# Patient Record
Sex: FEMALE | Race: WHITE
[De-identification: names, ages, dates, MRNs, and addresses within clinical notes are randomized per-mention and may not be internally consistent; named-entity substitution may affect disease eponyms.]

---

## 2017-01-28 ENCOUNTER — HOSPITAL ENCOUNTER (EMERGENCY)
Dept: HOSPITAL 62 - ER | Age: 67
LOS: 1 days | Discharge: HOME | End: 2017-01-29
Payer: MEDICARE

## 2017-01-28 DIAGNOSIS — R05: ICD-10-CM

## 2017-01-28 DIAGNOSIS — I10: ICD-10-CM

## 2017-01-28 DIAGNOSIS — I44.0: ICD-10-CM

## 2017-01-28 DIAGNOSIS — Z87.891: ICD-10-CM

## 2017-01-28 DIAGNOSIS — E87.1: ICD-10-CM

## 2017-01-28 DIAGNOSIS — R45.4: ICD-10-CM

## 2017-01-28 DIAGNOSIS — J44.9: ICD-10-CM

## 2017-01-28 DIAGNOSIS — D72.828: ICD-10-CM

## 2017-01-28 DIAGNOSIS — N39.0: Primary | ICD-10-CM

## 2017-01-28 LAB
ALBUMIN SERPL-MCNC: 4 G/DL (ref 3.5–5)
ALP SERPL-CCNC: 103 U/L (ref 38–126)
ALT SERPL-CCNC: 40 U/L (ref 9–52)
ANION GAP SERPL CALC-SCNC: 9 MMOL/L (ref 5–19)
AST SERPL-CCNC: 20 U/L (ref 14–36)
BASOPHILS NFR BLD MANUAL: 0 % (ref 0–2)
BILIRUB DIRECT SERPL-MCNC: 0 MG/DL (ref 0–0.3)
BILIRUB SERPL-MCNC: 0.3 MG/DL (ref 0.2–1.3)
BUN SERPL-MCNC: 15 MG/DL (ref 7–20)
CALCIUM: 9.2 MG/DL (ref 8.4–10.2)
CHLORIDE SERPL-SCNC: 92 MMOL/L (ref 98–107)
CK MB SERPL-MCNC: 1.44 NG/ML (ref ?–4.55)
CK SERPL-CCNC: 98 U/L (ref 30–135)
CO2 SERPL-SCNC: 25 MMOL/L (ref 22–30)
CREAT SERPL-MCNC: 0.84 MG/DL (ref 0.52–1.25)
EOSINOPHIL NFR BLD MANUAL: 2 % (ref 0–6)
ERYTHROCYTE [DISTWIDTH] IN BLOOD BY AUTOMATED COUNT: 12.6 % (ref 11.5–14)
GLUCOSE SERPL-MCNC: 148 MG/DL (ref 75–110)
HCT VFR BLD CALC: 33 % (ref 36–47)
HGB BLD-MCNC: 11.4 G/DL (ref 12–15.5)
HGB HCT DIFFERENCE: 1.2
MCH RBC QN AUTO: 31.6 PG (ref 27–33.4)
MCHC RBC AUTO-ENTMCNC: 34.6 G/DL (ref 32–36)
MCV RBC AUTO: 91 FL (ref 80–97)
NEUTS BAND NFR BLD MANUAL: 3 % (ref 3–5)
PLATELET CLUMP BLD QL SMEAR: PRESENT
POTASSIUM SERPL-SCNC: 4.5 MMOL/L (ref 3.6–5)
PROT SERPL-MCNC: 6.4 G/DL (ref 6.3–8.2)
RBC # BLD AUTO: 3.62 10^6/UL (ref 3.72–5.28)
RBC MORPH BLD: (no result)
SODIUM SERPL-SCNC: 126.2 MMOL/L (ref 137–145)
TOTAL CELLS COUNTED BLD: 100
TROPONIN I SERPL-MCNC: < 0.012 NG/ML
VARIANT LYMPHS NFR BLD MANUAL: 8 % (ref 13–45)
WBC # BLD AUTO: 15 10^3/UL (ref 4–10.5)

## 2017-01-28 PROCEDURE — 96365 THER/PROPH/DIAG IV INF INIT: CPT

## 2017-01-28 PROCEDURE — 85025 COMPLETE CBC W/AUTO DIFF WBC: CPT

## 2017-01-28 PROCEDURE — 82553 CREATINE MB FRACTION: CPT

## 2017-01-28 PROCEDURE — 80053 COMPREHEN METABOLIC PANEL: CPT

## 2017-01-28 PROCEDURE — 93005 ELECTROCARDIOGRAM TRACING: CPT

## 2017-01-28 PROCEDURE — 36415 COLL VENOUS BLD VENIPUNCTURE: CPT

## 2017-01-28 PROCEDURE — 99285 EMERGENCY DEPT VISIT HI MDM: CPT

## 2017-01-28 PROCEDURE — 81001 URINALYSIS AUTO W/SCOPE: CPT

## 2017-01-28 PROCEDURE — 80307 DRUG TEST PRSMV CHEM ANLYZR: CPT

## 2017-01-28 PROCEDURE — 71010: CPT

## 2017-01-28 PROCEDURE — 96361 HYDRATE IV INFUSION ADD-ON: CPT

## 2017-01-28 PROCEDURE — 93010 ELECTROCARDIOGRAM REPORT: CPT

## 2017-01-28 PROCEDURE — 84484 ASSAY OF TROPONIN QUANT: CPT

## 2017-01-28 PROCEDURE — 82550 ASSAY OF CK (CPK): CPT

## 2017-01-28 NOTE — ER DOCUMENT REPORT
ED General





- General


Stated Complaint: DIFFICULTY BREATHING


Time seen by provider: 21:50


Notes: 


Patient is a 66-year-old female that comes emergency department for chief 

complaint of an episode earlier tonight where she started feeling poorly and 

appeared pale, and felt nauseated.  EMS was called, they found her blood 

pressure to be low and she had hypoxia; report from  is that they were 

having sexual intercourse and smoking marijuana when patient began to feel 

poorly.  This is reportedly happened several times recently.  Patient denying 

any current symptoms, she states she has had a cough with white sputum 

production for the past 2 days, a past history of COPD, HTN.  Patient denies 

any fevers.





TRAVEL OUTSIDE OF THE [place] IN LAST 30 DAYS: No





- Related Data


Allergies/Adverse Reactions: 


 





No Known Allergies Allergy (Verified 09/28/15 22:48)


 











Past Medical History





- General


Information source: Patient





- Social History


Smoking Status: Former Smoker


Frequency of alcohol use: None


Drug Abuse: None


Lives with: Family


Family History: Reviewed & Not Pertinent





- Past Medical History


Cardiac Medical History: Reports: Hx Hypercholesterolemia, Hx Hypertension


Pulmonary Medical History: Reports: Hx Bronchitis, Hx COPD


Neurological Medical History: Denies: Hx Seizures


GI Medical History: Reports: Hx Gastritis, Hx Ulcer - Bleeding duodenal ulcer


Past Surgical History: Reports: Hx Vascular Surgery - Percussive pain 

stripping..  Denies: Hx Hysterectomy





- Immunizations


Hx Diphtheria, Pertussis, Tetanus Vaccination: Yes


Hx Pneumococcal Vaccination: 01/30/14





Review of Systems





- Review of Systems


Constitutional: No symptoms reported


EENT: No symptoms reported


Cardiovascular: See HPI


Respiratory: See HPI


Gastrointestinal: See HPI


Genitourinary: No symptoms reported


Female Genitourinary: No symptoms reported


Musculoskeletal: No symptoms reported


Skin: No symptoms reported


Hematologic/Lymphatic: No symptoms reported


Neurological/Psychological: See HPI





Physical Exam





- Vital signs


Vitals: 


 











Resp Pulse Ox


 


 11 L  99 


 


 01/28/17 21:41  01/28/17 21:41











Interpretation: Normal





- General


General appearance: Appears well, Alert


In distress: None





- HEENT


Head: Normocephalic, Atraumatic


Eyes: Normal


Pupils: PERRL





- Respiratory


Respiratory status: No respiratory distress.  No: Labored, Tachypnea


Chest status: Nontender


Breath sounds: Decreased air movement - Minimally.  No: Nonproductive cough, 

Stridor, Wheezing


Chest palpation: Normal





- Cardiovascular


Rhythm: Regular.  No: Tachycardia


Heart sounds: Normal auscultation, S1 appreciated, S2 appreciated


Murmur: No





- Abdominal


Inspection: Normal


Distension: No distension


Bowel sounds: Normal


Tenderness: Nontender.  No: Tender, Guarding


Organomegaly: No organomegaly





- Back


Back: Normal, Nontender





- Extremities


General upper extremity: Normal inspection, Nontender, Normal color, Normal ROM

, Normal temperature


General lower extremity: Normal inspection, Nontender, Normal color, Normal ROM

, Normal temperature, Normal weight bearing.  No: Reji's sign





- Neurological


Neuro grossly intact: Yes


Cognition: Normal.  No: Confused, Inattentive


Orientation: AAOx4.  No: Disoriented to person, Disoriented to place, 

Disoriented to time, Disoriented to events


Giovani Coma Scale Eye Opening: Spontaneous


Swatara Coma Scale Verbal: Oriented


Giovani Coma Scale Motor: Obeys Commands


Giovani Coma Scale Total: 15


Speech: Normal


Cranial nerves: Normal


Cerebellar coordination: Normal


Motor strength normal: LUE, RUE, LLE, RLE


Additional motor exam normals: Equal 


Sensory: Normal





- Psychological


Associated symptoms: Other - Patient has a very slightly sluggish response to 

any questions but answers all questions appropriately, patient is actually 

mildly irritable and answer some questions shortly.  Patient on reevaluation 

normalized.





- Skin


Skin Temperature: Warm


Skin Moisture: Dry


Skin Color: Normal





Course





- Re-evaluation


Re-evalutation: 


Patient is responsive and answers questions appropriately but doesn't appear to 

be under the influence of a substance.  No neurological deficits, no hypotension

, tachycardia, or fever. Lungs clear. No tachypnea. 





Chest x-ray is unremarkable. EKG sinus rhythm with first-degree block, no 

change from prior.  Chemistry shows low sodium, compared to previous patient 

has borderline to low sodium frequently.  Patient was already given a bolus of 

normal saline.  Patient states that she is intentionally sodium restricting 

from her diet on her physician's orders, she states that she "might of taken it 

the extreme". 





CBC shows leukocytosis of 15,000 with elevation of neutrophils but no bands.  

Urinalysis shows large leukocyte esterase with white blood cells and bacteria.  

Urine cultured, patient given Levaquin, on reevaluation patient looking improved

, states she feels great, requesting to go home.  Patient declining any 

additional workup, however she does agree to follow-up with her provider within 

the next several days for repeat of her sodium level, agrees to take Levaquin 

antibiotic, and agrees to return if she develops any worsening or new 

concerning symptoms.   states satisfaction agreement as well.











- Vital Signs


Vital signs: 


 











Temp Pulse Resp BP Pulse Ox


 


 97.6 F   62   16   131/80 H  100 


 


 01/29/17 02:45  01/28/17 22:19  01/29/17 02:25  01/29/17 02:25  01/29/17 02:25














- Laboratory


Result Diagrams: 


 01/28/17 23:05





 01/28/17 23:05


Laboratory results interpreted by me: 


 











  01/28/17 01/28/17 01/28/17





  23:05 23:05 23:53


 


WBC  15.0 H  


 


RBC  3.62 L  


 


Hgb  11.4 L  


 


Hct  33.0 L  


 


Seg Neuts % (Manual)  85 H  


 


Lymphocytes % (Manual)  8 L  


 


Monocytes % (Manual)  2 L  


 


Abs Neuts (Manual)  13.2 H  


 


Sodium   126.2 L 


 


Chloride   92 L 


 


Glucose   148 H 


 


Urine Protein    30 H


 


Ur Leukocyte Esterase    LARGE H














- EKG Interpretation by Me


EKG shows normal: Sinus rhythm


Rate: Normal


Rhythm: NSR


Heart block present: 1st Degree


When compared to previous EKG there are: No significant change





Discharge





- Discharge


Clinical Impression: 


 Hyponatremia, Nausea





Urinary tract infection


Qualifiers:


 Urinary tract infection type: site unspecified Hematuria presence: without 

hematuria Qualified Code(s): N39.0 - Urinary tract infection, site not specified





Condition: Stable


Disposition: HOME, SELF-CARE


Additional Instructions: 


Your workup shows urinary tract infection and low sodium.  Please take the 

Levaquin antibiotic as directed, you have been given treatment for low sodium 

but please follow-up within the next several days with her primary care for 

repeat.  Stop sodium/salt restriction to the extent that you're performing, you 

need more in your diet.


Avoid marijuana as this appears to cause nausea in your case.


Return to emergency department immediately for any concerning or worsening 

symptoms including shortness breath, chest pain, passing out, fever, etc.


Prescriptions: 


Levofloxacin [Levaquin 500 mg Tablet] 500 mg PO DAILY #7 tablet


Referrals: 


AGUSTIN LARSEN FNP-C [Primary Care Provider] - Follow up as needed

## 2017-01-29 VITALS — SYSTOLIC BLOOD PRESSURE: 131 MMHG | DIASTOLIC BLOOD PRESSURE: 80 MMHG

## 2017-01-29 LAB
APPEARANCE UR: (no result)
BARBITURATES UR QL SCN: NEGATIVE
BILIRUB UR QL STRIP: NEGATIVE
GLUCOSE UR STRIP-MCNC: NEGATIVE MG/DL
KETONES UR STRIP-MCNC: NEGATIVE MG/DL
METHADONE UR QL SCN: NEGATIVE
NITRITE UR QL STRIP: NEGATIVE
PCP UR QL SCN: NEGATIVE
PH UR STRIP: 6 [PH] (ref 5–9)
PROT UR STRIP-MCNC: 30 MG/DL
SP GR UR STRIP: 1.01
URINE OPIATES LOW: NEGATIVE
UROBILINOGEN UR-MCNC: NEGATIVE MG/DL (ref ?–2)

## 2017-01-29 NOTE — EKG REPORT
SEVERITY:- ABNORMAL ECG -

SINUS RHYTHM

FIRST DEGREE AV BLOCK

:

Confirmed by: Deja Scott MD 29-Jan-2017 09:38:08

## 2017-04-13 ENCOUNTER — HOSPITAL ENCOUNTER (OUTPATIENT)
Dept: HOSPITAL 62 - WI | Age: 67
End: 2017-04-13
Attending: FAMILY MEDICINE
Payer: MEDICAID

## 2017-04-13 DIAGNOSIS — M81.0: Primary | ICD-10-CM

## 2017-04-13 PROCEDURE — 77080 DXA BONE DENSITY AXIAL: CPT

## 2017-07-06 ENCOUNTER — HOSPITAL ENCOUNTER (OUTPATIENT)
Dept: HOSPITAL 62 - WI | Age: 67
End: 2017-07-06
Attending: FAMILY MEDICINE
Payer: MEDICARE

## 2017-07-06 DIAGNOSIS — Z12.31: Primary | ICD-10-CM

## 2017-07-06 PROCEDURE — 77067 SCR MAMMO BI INCL CAD: CPT

## 2017-07-06 PROCEDURE — G0202 SCR MAMMO BI INCL CAD: HCPCS

## 2017-07-06 PROCEDURE — 77063 BREAST TOMOSYNTHESIS BI: CPT

## 2017-07-07 NOTE — WOMENS IMAGING REPORT
EXAM DESCRIPTION:  3D SCREENING MAMMO BILAT



COMPLETED DATE/TIME:  7/6/2017 8:46 am



REASON FOR STUDY:  ROUTINE SCREENING; Z12.31 Z12.31  ENCNTR SCREEN MAMMOGRAM FOR MALIGNANT NEOPLASM O
F JUAN ANTONIO



COMPARISON:  2014, 2015



TECHNIQUE:  Standard craniocaudal and mediolateral oblique views of each breast recorded using digita
l acquisition and breast tomosynthesis.



LIMITATIONS:  None.



FINDINGS:  Findings present which are benign by mammographic criteria.  No suspicious masses, calcifi
cations or architectural distortion.

Pertinent benign findings: Stable bilateral breast calcifications

Read with the assistance of CAD.

.[facility] - R2 Cenova Version 1.3

.[facility] - R2 Cenova Version 1.3

.[facility] - R2 Cenova Version 2.4

.[facility] - R2 Cenova Version 2.4

.On license of [facility] - R2  Version 9.2

Benign mammographic findings may include one or more of the following:  Smooth masses, popcorn/rim/co
arse calcifications, asymmetries, post-procedure changes, and lesions with long-standing stability.



IMPRESSION:  BENIGN MAMMOGRAPHIC FINDINGS.  BIRADS 2



BREAST DENSITY:  b. There are scattered areas of fibroglandular density.



BIRAD:  2 BENIGN FINDING(S)



RECOMMENDATION:  RECOMMENDATION: ROUTINE SCREENING

Please continue yearly bilateral screening tomosynthesis in July 2018



COMMENT:  The patient has been notified of the results by letter per SA requirements. Additional no
tification policies are in place for contacting patient with suspicious or incomplete findings.

Quality ID #[id number]: The American College of Radiology recommends an annual screening mammogram for women
 aged 40 years or over. This facility utilizes a reminder system to ensure that all patients receive 
reminder letters, and/or direct phone calls for appointments. This includes reminders for routine scr
eening mammograms, diagnostic mammograms, or other Breast Imaging Interventions when appropriate.  Th
is patient will be placed in the appropriate reminder system.

The American College of Radiology (ACR) has developed recommendations for screening MRI of the breast
s in certain patient populations, to be used in conjunction with mammography.  Breast MRI surveillanc
e may be appropriate for women with more than 20% lifetime risk of developing breast cancer  as deter
mined by genetic testing, significant family history of the disease, or history of mantle radiation f
or Hodgkins Disease.  ACR Practice Guidelines 2008.

[employer]



PQRS 6045F: Fluoroscopic imaging is not utilized for breast tomosynthesis.



TECHNICAL DOCUMENTATION:  FINDING NUMBER: (1)

ASSESSMENT: (1)

JOB ID:  [id number]

 2011 [employer]- All Rights Reserved

## 2018-02-21 ENCOUNTER — HOSPITAL ENCOUNTER (INPATIENT)
Dept: HOSPITAL 62 - ER | Age: 68
LOS: 3 days | Discharge: HOME | DRG: 378 | End: 2018-02-24
Attending: INTERNAL MEDICINE | Admitting: INTERNAL MEDICINE
Payer: MEDICARE

## 2018-02-21 DIAGNOSIS — J44.9: ICD-10-CM

## 2018-02-21 DIAGNOSIS — K20.9: ICD-10-CM

## 2018-02-21 DIAGNOSIS — I10: ICD-10-CM

## 2018-02-21 DIAGNOSIS — K92.1: Primary | ICD-10-CM

## 2018-02-21 DIAGNOSIS — D50.9: ICD-10-CM

## 2018-02-21 DIAGNOSIS — D62: ICD-10-CM

## 2018-02-21 DIAGNOSIS — E78.2: ICD-10-CM

## 2018-02-21 DIAGNOSIS — K25.9: ICD-10-CM

## 2018-02-21 DIAGNOSIS — T39.315A: ICD-10-CM

## 2018-02-21 DIAGNOSIS — K29.80: ICD-10-CM

## 2018-02-21 LAB
ABSOLUTE LYMPHOCYTES# (MANUAL): 3.1 10^3/UL (ref 0.5–4.7)
ABSOLUTE MONOCYTES # (MANUAL): 0.9 10^3/UL (ref 0.1–1.4)
ABSOLUTE NEUTROPHILS# (MANUAL): 13.3 10^3/UL (ref 1.7–8.2)
ADD MANUAL DIFF: YES
ALBUMIN SERPL-MCNC: 4.1 G/DL (ref 3.5–5)
ALP SERPL-CCNC: 87 U/L (ref 38–126)
ALT SERPL-CCNC: 38 U/L (ref 9–52)
ANION GAP SERPL CALC-SCNC: 12 MMOL/L (ref 5–19)
AST SERPL-CCNC: 19 U/L (ref 14–36)
BASOPHILS NFR BLD MANUAL: 0 % (ref 0–2)
BILIRUB DIRECT SERPL-MCNC: 0.5 MG/DL (ref 0–0.4)
BILIRUB SERPL-MCNC: 0.6 MG/DL (ref 0.2–1.3)
BUN SERPL-MCNC: 16 MG/DL (ref 7–20)
CALCIUM: 8.8 MG/DL (ref 8.4–10.2)
CHLORIDE SERPL-SCNC: 95 MMOL/L (ref 98–107)
CO2 SERPL-SCNC: 27 MMOL/L (ref 22–30)
EOSINOPHIL NFR BLD MANUAL: 4 % (ref 0–6)
ERYTHROCYTE [DISTWIDTH] IN BLOOD BY AUTOMATED COUNT: 12.7 % (ref 11.5–14)
ERYTHROCYTE [DISTWIDTH] IN BLOOD BY AUTOMATED COUNT: 12.8 % (ref 11.5–14)
GLUCOSE SERPL-MCNC: 119 MG/DL (ref 75–110)
HCT VFR BLD CALC: 31.2 % (ref 36–47)
HCT VFR BLD CALC: 36.7 % (ref 36–47)
HGB BLD-MCNC: 10.9 G/DL (ref 12–15.5)
HGB BLD-MCNC: 12.7 G/DL (ref 12–15.5)
MCH RBC QN AUTO: 31.6 PG (ref 27–33.4)
MCH RBC QN AUTO: 31.6 PG (ref 27–33.4)
MCHC RBC AUTO-ENTMCNC: 34.6 G/DL (ref 32–36)
MCHC RBC AUTO-ENTMCNC: 34.8 G/DL (ref 32–36)
MCV RBC AUTO: 91 FL (ref 80–97)
MCV RBC AUTO: 91 FL (ref 80–97)
METAMYELOCYTES % (MANUAL): 3 %
MONOCYTES % (MANUAL): 5 % (ref 3–13)
MYELOCYTES % (MANUAL): 2 %
PLATELET # BLD: 338 10^3/UL (ref 150–450)
PLATELET # BLD: 435 10^3/UL (ref 150–450)
PLATELET CLUMP BLD QL SMEAR: PRESENT
POLYCHROMASIA BLD QL SMEAR: SLIGHT
POTASSIUM SERPL-SCNC: 3.9 MMOL/L (ref 3.6–5)
PROT SERPL-MCNC: 6.5 G/DL (ref 6.3–8.2)
RBC # BLD AUTO: 3.43 10^6/UL (ref 3.72–5.28)
RBC # BLD AUTO: 4.02 10^6/UL (ref 3.72–5.28)
SEGMENTED NEUTROPHILS % (MAN): 69 % (ref 42–78)
SODIUM SERPL-SCNC: 134.3 MMOL/L (ref 137–145)
TOTAL CELLS COUNTED BLD: 100
TOXIC GRANULES BLD QL SMEAR: SLIGHT
VARIANT LYMPHS NFR BLD MANUAL: 15 % (ref 13–45)
WBC # BLD AUTO: 14.5 10^3/UL (ref 4–10.5)
WBC # BLD AUTO: 18 10^3/UL (ref 4–10.5)

## 2018-02-21 PROCEDURE — 88305 TISSUE EXAM BY PATHOLOGIST: CPT

## 2018-02-21 PROCEDURE — 88342 IMHCHEM/IMCYTCHM 1ST ANTB: CPT

## 2018-02-21 PROCEDURE — 83735 ASSAY OF MAGNESIUM: CPT

## 2018-02-21 PROCEDURE — 99285 EMERGENCY DEPT VISIT HI MDM: CPT

## 2018-02-21 PROCEDURE — 86850 RBC ANTIBODY SCREEN: CPT

## 2018-02-21 PROCEDURE — 36415 COLL VENOUS BLD VENIPUNCTURE: CPT

## 2018-02-21 PROCEDURE — 85027 COMPLETE CBC AUTOMATED: CPT

## 2018-02-21 PROCEDURE — 96366 THER/PROPH/DIAG IV INF ADDON: CPT

## 2018-02-21 PROCEDURE — 80053 COMPREHEN METABOLIC PANEL: CPT

## 2018-02-21 PROCEDURE — 86900 BLOOD TYPING SEROLOGIC ABO: CPT

## 2018-02-21 PROCEDURE — 82272 OCCULT BLD FECES 1-3 TESTS: CPT

## 2018-02-21 PROCEDURE — 85025 COMPLETE CBC W/AUTO DIFF WBC: CPT

## 2018-02-21 PROCEDURE — 96375 TX/PRO/DX INJ NEW DRUG ADDON: CPT

## 2018-02-21 PROCEDURE — 43239 EGD BIOPSY SINGLE/MULTIPLE: CPT

## 2018-02-21 PROCEDURE — S0164 INJECTION PANTROPRAZOLE: HCPCS

## 2018-02-21 PROCEDURE — 86901 BLOOD TYPING SEROLOGIC RH(D): CPT

## 2018-02-21 PROCEDURE — 96361 HYDRATE IV INFUSION ADD-ON: CPT

## 2018-02-21 PROCEDURE — 84484 ASSAY OF TROPONIN QUANT: CPT

## 2018-02-21 PROCEDURE — 96365 THER/PROPH/DIAG IV INF INIT: CPT

## 2018-02-21 RX ADMIN — SODIUM CHLORIDE, SODIUM LACTATE, POTASSIUM CHLORIDE, AND CALCIUM CHLORIDE PRN ML: .6; .31; .03; .02 INJECTION, SOLUTION INTRAVENOUS at 23:36

## 2018-02-21 RX ADMIN — PANTOPRAZOLE SODIUM PRN MG: 40 INJECTION, POWDER, FOR SOLUTION INTRAVENOUS at 16:23

## 2018-02-21 NOTE — PDOC CONSULTATION
Consultation


Consult Date: 02/21/18


Attending physician:: SETH BELTRE


Consult reason:: melena





History of Present Illness


Admission Date/PCP: 


  02/21/18 16:10





  FCO HERNDON MD





History of Present Illness: 


ACE ERNST is a 67 year old female


she had previously been seen


she does have history of gastric ulcers due to NSAID use in the past


had been having some back pain


was told to start on NSAIDS


I had previously cautioned the patient not to take NSAIDs 


she started to develop melena


I was called from the ED


Dr Alcala wants to admit the patient for observation


H/H is stable


she will need repeat EGD


start on PPI


denies any early satiety or significant weight loss





Past Medical History


Cardiac Medical History: Reports: Hyperlipidema, Hypertension


Pulmonary Medical History: Reports: Bronchitis, Chronic Obstructive Pulmonary 

Disease (COPD)


Neurological Medical History: 


   Denies: Seizures


Hematology: Reports: Anemia - IRON DEFICIENCY ANEMIA/ RECEIVING IRON INFUSIONS





Past Surgical History


Past Surgical History: Reports: Vascular Surgery - Percussive pain stripping.


   Denies: Hysterectomy





Social History


Smoking Status: Never Smoker


Frequency of Alcohol Use: None


Hx Recreational Drug Use: No


Hx Prescription Drug Abuse: No





Family History


Family History: Reviewed & Not Pertinent


Parental Family History Reviewed: Yes


Children Family History Reviewed: Unknown


Sibling(s) Family History Reviewed.: Unknown





Medication/Allergy


Home Medications: 








Albuterol Sulfate [Albuterol Sulfate Hfa] 1 - 2 puff IH Q4 PRN 01/22/14 


Fluticasone/Salmeterol [Advair 500-50 Diskus 14 Dose/Diskus] 1 inh IH Q12H 01/22 /14 


Lisinopril [Prinivil 10 mg Tablet] 20 mg PO DAILY #30 tablet 01/30/14 


Esomeprazole Mag Trihydrate [Nexium] 40 mg PO DAILY 03/04/14 


Acidoph/L.bulg/Bif.b/S.thermop [Bacid Caplet] 1 tab PO BID #28 tablet 07/13/14 


Cholecalciferol (Vitamin D3) [Vitamin D] 1,000 unit PO DAILY 07/20/15 


Multivitamin [Daily Multiple Vitamin] 1 each PO DAILY 07/20/15 


Naproxen Sodium [Aleve] 1 tab PO ASDIR PRN 07/20/15 


Levofloxacin [Levaquin 500 mg Tablet] 500 mg PO DAILY #7 tablet 01/29/17 








Allergies/Adverse Reactions: 


 





No Known Allergies Allergy (Verified 02/21/18 11:46)


 











Review of Systems


Constitutional: ABSENT: fever(s), headache(s), night sweats, weakness


Eyes: ABSENT: visual disturbances


Ears: ABSENT: hearing changes


Nose, Mouth, and Throat: ABSENT: mouth pain, sore throat


Cardiovascular: ABSENT: edema, orthropnea, palpitations


Respiratory: ABSENT: dyspnea, hemoptysis


Gastrointestinal: PRESENT: melena.  ABSENT: dysphagia, hematochezia


Genitourinary: ABSENT: dysuria, hematuria


Musculoskeletal: ABSENT: deformity, joint swelling


Integumentary: ABSENT: lesions, pruritus


Neurological: ABSENT: syncope, tingling, tremor(s), vertigo


Endocrine: ABSENT: polydipsia, polyphagia, polyuria


Hematologic/Lymphatic: ABSENT: easy bruising





Physical Exam


Vital Signs: 


 











Temp Pulse Resp BP Pulse Ox


 


 97.7 F   60   12   156/75 H  99 


 


 02/21/18 12:12  02/21/18 12:12  02/21/18 15:01  02/21/18 15:01  02/21/18 15:01











General appearance: PRESENT: no acute distress, well-developed, well-nourished


Head exam: PRESENT: atraumatic, normocephalic


Eye exam: PRESENT: EOMI, PERRLA.  ABSENT: nystagmus, periorbital swelling, 

scleral icterus


Mouth exam: PRESENT: moist, neck supple


Throat exam: ABSENT: tonsillar exudate, tonsillogmegaly


Neck exam: ABSENT: meningismus, tenderness, thyromegaly


Respiratory exam: PRESENT: symmetrical, unlabored.  ABSENT: tachypnea, wheezes


Cardiovascular exam: PRESENT: RRR, +S1, +S2


GI/Abdominal exam: PRESENT: soft.  ABSENT: rebound, rigid, tenderness


Extremities exam: ABSENT: joint swelling, pedal edema


Neurological exam: PRESENT: alert, awake, oriented to time, oriented to 

situation


Focused psych exam: ABSENT: restlessness


Skin exam: PRESENT: normal color.  ABSENT: mottled, pallor, petechiae, urticaria

, vesicles





Assessment & Plan





- Diagnosis


(1) Upper GI bleed


Plan: 


likely secondary to use of NSAIDS


has had gastric ulcers in the past


Risks, benefits and alternatives are discussed with the patient in detail


further recommendations to follow


transfuse as necessary


follow up H/H


continue on PPI 








- Time


Time Spent: 50 to 70 Minutes

## 2018-02-21 NOTE — PDOC H&P
History of Present Illness


Admission Date/PCP: 


  02/21/18 16:10





  FCO HERNDON MD





Patient complains of: Dark stools


History of Present Illness: 





Patient is a 67-year-old female that presents to our facility with complaint of 

dark stools.  Patient states that she was started on naproxen on February 14 

for back pain.  Patient states for the last day or 2 she has noticed that she 

has had black stools.  ER physician guaiac stools that were heme positive.





Past Medical History


Cardiac Medical History: Reports: Hyperlipidema, Hypertension


Pulmonary Medical History: Reports: Bronchitis, Chronic Obstructive Pulmonary 

Disease (COPD)


Neurological Medical History: 


   Denies: Seizures


Hematology: Reports: Anemia - IRON DEFICIENCY ANEMIA/ RECEIVING IRON INFUSIONS





Past Surgical History


Past Surgical History: Reports: Vascular Surgery - Percussive pain stripping.


   Denies: Hysterectomy





Social History


Information Source: Patient


Lives with: Spouse/Significant other


Smoking Status: Never Smoker


Frequency of Alcohol Use: None


Hx Recreational Drug Use: No


Hx Prescription Drug Abuse: No





- Advance Directive


Resuscitation Status: Full Code





Family History


Family History: Reviewed & Not Pertinent


Parental Family History Reviewed: Yes


Children Family History Reviewed: Yes


Sibling(s) Family History Reviewed.: Yes





Medication/Allergy


Allergies/Adverse Reactions: 


 





No Known Allergies Allergy (Verified 02/21/18 11:46)


 











Review of Systems


Constitutional: ABSENT: chills, fever(s), headache(s), weight gain, weight loss


Eyes: ABSENT: visual disturbances


Ears: ABSENT: hearing changes


Cardiovascular: ABSENT: chest pain, dyspnea on exertion, edema, orthropnea, 

palpitations


Respiratory: ABSENT: cough, hemoptysis


Gastrointestinal: PRESENT: melena


Genitourinary: ABSENT: dysuria, hematuria


Musculoskeletal: ABSENT: joint swelling


Integumentary: ABSENT: rash, wounds


Neurological: ABSENT: abnormal gait, abnormal speech, confusion, dizziness, 

focal weakness, syncope


Psychiatric: ABSENT: anxiety, depression, homidical ideation, suicidal ideation


Endocrine: ABSENT: cold intolerance, heat intolerance, polydipsia, polyuria


Hematologic/Lymphatic: ABSENT: easy bleeding, easy bruising





Physical Exam


Vital Signs: 


 











Temp Pulse Resp BP Pulse Ox


 


 97.7 F   60   12   156/75 H  99 


 


 02/21/18 12:12  02/21/18 12:12  02/21/18 15:01  02/21/18 15:01  02/21/18 15:01











General appearance: PRESENT: no acute distress, well-developed, well-nourished


Head exam: PRESENT: atraumatic, normocephalic


Eye exam: PRESENT: conjunctiva pink, EOMI.  ABSENT: scleral icterus


Ear exam: PRESENT: normal external ear exam


Mouth exam: PRESENT: moist, tongue midline


Neck exam: ABSENT: carotid bruit, JVD, lymphadenopathy, thyromegaly


Respiratory exam: PRESENT: clear to auscultation jeimy.  ABSENT: rales, rhonchi, 

wheezes


Cardiovascular exam: PRESENT: RRR.  ABSENT: diastolic murmur, rubs, systolic 

murmur


Pulses: PRESENT: normal dorsalis pedis pul


Vascular exam: PRESENT: normal capillary refill


GI/Abdominal exam: PRESENT: normal bowel sounds, other - Positive for 

midepigastric tenderness to palpation


Rectal exam: PRESENT: deferred


Extremities exam: PRESENT: full ROM.  ABSENT: calf tenderness, clubbing, pedal 

edema


Neurological exam: PRESENT: alert, awake, oriented to person, oriented to place

, oriented to time, oriented to situation, CN II-XII grossly intact.  ABSENT: 

motor sensory deficit


Psychiatric exam: PRESENT: appropriate affect, normal mood.  ABSENT: homicidal 

ideation, suicidal ideation


Skin exam: PRESENT: dry, intact, warm.  ABSENT: cyanosis, rash





Assessment & Plan





- Diagnosis


(1) Upper GI bleed


Is this a current diagnosis for this admission?: Yes   


Plan: 


Most likely secondary to NSAIDs: GI has been consulted and planning to scope 

today or tomorrow.  Patient is currently on a PPI drip and was given a bolus.  

Patient's hemoglobin is 12 we will check hemoglobin this evening








(2) HTN (hypertension)


Is this a current diagnosis for this admission?: Yes   


Plan: 


We will hold home medications for now will monitor blood pressure.








(3) DVT prophylaxis


Is this a current diagnosis for this admission?: Yes   


Plan: 


SCDs








- Time


Time Spent: 30 to 50 Minutes

## 2018-02-21 NOTE — ER DOCUMENT REPORT
ED GI/





- General


Chief Complaint: Abdominal Pain


Stated Complaint: STOOL PROBLEMS


Time Seen by Provider: 02/21/18 13:30


Notes: 


The patient is a 67-year-old female, past medical history multiple bleeding 

gastric ulcers that required intervention, presents with 1 day of multiple 

episodes of dark tarry stools.  She was started on Naprosyn last week by her 

primary care physician for strained back, but denies any other blood thinners.  

Patient denies abdominal pain, nausea, vomiting, hematemesis, rectal pain, 

dysuria, hematuria, syncope, chest pain, lightheadedness or shortness of breath.


TRAVEL OUTSIDE OF THE [place] IN LAST 30 DAYS: No





- Related Data


Allergies/Adverse Reactions: 


 





No Known Allergies Allergy (Verified 02/21/18 11:46)


 











Past Medical History





- General


Information source: Patient





- Social History


Smoking Status: Never Smoker


Frequency of alcohol use: None


Drug Abuse: None


Family History: Reviewed & Not Pertinent


Patient has suicidal ideation: No


Patient has homicidal ideation: No





- Past Medical History


Cardiac Medical History: Reports: Hx Hypercholesterolemia, Hx Hypertension


Pulmonary Medical History: Reports: Hx Bronchitis, Hx COPD


Neurological Medical History: Denies: Hx Seizures


Renal/ Medical History: Denies: Hx Peritoneal Dialysis


GI Medical History: Reports: Hx Gastritis, Hx Ulcer - Bleeding duodenal ulcer


Past Surgical History: Reports: Hx Vascular Surgery - Percussive pain 

stripping..  Denies: Hx Hysterectomy





- Immunizations


Hx Diphtheria, Pertussis, Tetanus Vaccination: Yes


Hx Pneumococcal Vaccination: 01/30/14





Review of Systems





- Review of Systems


Notes: 


REVIEW OF SYSTEMS:


CONSTITUTIONAL: -fevers, -chills


EENT: -eye pain, -difficulty swallowing, -nasal congestion


CARDIOVASCULAR: -chest pain, -syncope.


RESPIRATORY: -cough, -SOB


GASTROINTESTINAL: -abdominal pain, -nausea, -vomiting, -diarrhea, +dark stools


GENITOURINARY: -dysuria, -hematuria


MUSCULOSKELETAL: -back pain, -neck pain


SKIN: -rash or skin lesions.


HEMATOLOGIC: -easy bruising or bleeding.


LYMPHATIC: -swollen, enlarged glands.


NEUROLOGICAL: -altered mental status or loss of consciousness, -headache, -

neurologic symptoms


PSYCHIATRIC: -anxiety, -depression.


ALL OTHER SYSTEMS REVIEWED AND NEGATIVE.





Physical Exam





- Vital signs


Vitals: 


 











Temp Pulse Resp BP Pulse Ox


 


 97.7 F   60   18   117/67   99 


 


 02/21/18 12:12  02/21/18 12:12  02/21/18 12:12  02/21/18 12:12  02/21/18 12:12














- Notes


Notes: 


PHYSICAL EXAMINATION:


GENERAL: Well-appearing, well-nourished and in no acute distress.


HEAD: Atraumatic, normocephalic.


EYES: Pupils equal round and reactive to light, extraocular movements intact, 

sclera anicteric, conjunctiva are normal.


ENT: nares patent, oropharynx clear without exudates.  Moist mucous membranes.


NECK: Normal range of motion, supple without lymphadenopathy


LUNGS: Breath sounds clear to auscultation bilaterally and equal.  No wheezes 

rales or rhonchi.


HEART: Regular rate and rhythm without murmurs


ABDOMEN: Soft, nontender, normoactive bowel sounds.  No guarding, no rebound.  

No masses appreciated. Dark stool. Pt deferred rectal exam due to stool in 

bedside comode


EXTREMITIES: Normal range of motion, no pitting or edema.  No cyanosis.


NEUROLOGICAL: Cranial nerves grossly intact.  Normal speech, normal gait.  

Normal sensory and motor exams.


PSYCH: Normal mood, normal affect.


SKIN: Warm, Dry, normal turgor, no rashes or lesions noted.





Course





- Re-evaluation


Re-evalutation: 


Patient with suspected upper GI bleed again due to recent addition of anti-

inflammatories.  Instructed patient to never take any NSAIDs.  Currently, her 

vital signs are normal and her hemoglobin is 12.7, but patient said that she 

has required multiple blood transfusions in the past due to rapidly bleeding 

gastric ulcers.  Patient also with a leukocytosis, but this appears chronic in 

nature and she has had multiple WBC levels in the past. PMD is Dr. Kei Miller.





02/21/18 14:35 Spoke to Dr. Jauregui (GI) and he will see the patient. Will keep 

the patient NPO.





02/21/18 14:57 Spoke to Dr. Elliott and he will admit patient.  PPI drip 

started.





- Vital Signs


Vital signs: 


 











Temp Pulse Resp BP Pulse Ox


 


 97.7 F   60   18   117/67   99 


 


 02/21/18 12:12  02/21/18 12:12  02/21/18 12:12  02/21/18 12:12  02/21/18 12:12














- Laboratory


Result Diagrams: 


 02/21/18 13:50





 02/21/18 13:50


Laboratory results interpreted by me: 


 











  02/21/18 02/21/18





  13:50 13:50


 


WBC  18.0 H 


 


Metamyelocytes %  3 H 


 


Myelocytes %  2 H 


 


Abs Neuts (Manual)  13.3 H 


 


Absolute Eos (Manual)  0.7 H 


 


Sodium   134.3 L


 


Chloride   95 L


 


Glucose   119 H


 


Direct Bilirubin   0.5 H














Discharge





- Discharge


Clinical Impression: 


 Upper GI bleed





Condition: Stable


Disposition: ADMITTED AS OBSERVATION


Admitting Provider: Hospitalist - Lexi


Unit Admitted: Telemetry

## 2018-02-22 LAB
ALBUMIN SERPL-MCNC: 3.3 G/DL (ref 3.5–5)
ALP SERPL-CCNC: 66 U/L (ref 38–126)
ALT SERPL-CCNC: 37 U/L (ref 9–52)
ANION GAP SERPL CALC-SCNC: 6 MMOL/L (ref 5–19)
AST SERPL-CCNC: 16 U/L (ref 14–36)
BILIRUB DIRECT SERPL-MCNC: 0.3 MG/DL (ref 0–0.4)
BILIRUB SERPL-MCNC: 0.5 MG/DL (ref 0.2–1.3)
BUN SERPL-MCNC: 12 MG/DL (ref 7–20)
CALCIUM: 8.5 MG/DL (ref 8.4–10.2)
CHLORIDE SERPL-SCNC: 107 MMOL/L (ref 98–107)
CO2 SERPL-SCNC: 26 MMOL/L (ref 22–30)
ERYTHROCYTE [DISTWIDTH] IN BLOOD BY AUTOMATED COUNT: 12.5 % (ref 11.5–14)
ERYTHROCYTE [DISTWIDTH] IN BLOOD BY AUTOMATED COUNT: 12.9 % (ref 11.5–14)
GLUCOSE SERPL-MCNC: 97 MG/DL (ref 75–110)
HCT VFR BLD CALC: 30.1 % (ref 36–47)
HCT VFR BLD CALC: 31.7 % (ref 36–47)
HGB BLD-MCNC: 10.4 G/DL (ref 12–15.5)
HGB BLD-MCNC: 10.7 G/DL (ref 12–15.5)
MCH RBC QN AUTO: 31.1 PG (ref 27–33.4)
MCH RBC QN AUTO: 31.4 PG (ref 27–33.4)
MCHC RBC AUTO-ENTMCNC: 33.7 G/DL (ref 32–36)
MCHC RBC AUTO-ENTMCNC: 34.5 G/DL (ref 32–36)
MCV RBC AUTO: 91 FL (ref 80–97)
MCV RBC AUTO: 92 FL (ref 80–97)
PATH REV BLD -IMP: (no result)
PLATELET # BLD: 328 10^3/UL (ref 150–450)
PLATELET # BLD: 343 10^3/UL (ref 150–450)
POTASSIUM SERPL-SCNC: 4.3 MMOL/L (ref 3.6–5)
PROT SERPL-MCNC: 5.4 G/DL (ref 6.3–8.2)
RBC # BLD AUTO: 3.31 10^6/UL (ref 3.72–5.28)
RBC # BLD AUTO: 3.44 10^6/UL (ref 3.72–5.28)
SODIUM SERPL-SCNC: 138.5 MMOL/L (ref 137–145)
WBC # BLD AUTO: 11.4 10^3/UL (ref 4–10.5)
WBC # BLD AUTO: 12.4 10^3/UL (ref 4–10.5)

## 2018-02-22 PROCEDURE — 0DB98ZX EXCISION OF DUODENUM, VIA NATURAL OR ARTIFICIAL OPENING ENDOSCOPIC, DIAGNOSTIC: ICD-10-PCS | Performed by: INTERNAL MEDICINE

## 2018-02-22 PROCEDURE — 0DB68ZX EXCISION OF STOMACH, VIA NATURAL OR ARTIFICIAL OPENING ENDOSCOPIC, DIAGNOSTIC: ICD-10-PCS | Performed by: INTERNAL MEDICINE

## 2018-02-22 RX ADMIN — PANTOPRAZOLE SODIUM PRN MG: 40 INJECTION, POWDER, FOR SOLUTION INTRAVENOUS at 04:31

## 2018-02-22 RX ADMIN — SODIUM CHLORIDE, SODIUM LACTATE, POTASSIUM CHLORIDE, AND CALCIUM CHLORIDE PRN ML: .6; .31; .03; .02 INJECTION, SOLUTION INTRAVENOUS at 09:19

## 2018-02-22 RX ADMIN — PANTOPRAZOLE SODIUM PRN MG: 40 INJECTION, POWDER, FOR SOLUTION INTRAVENOUS at 17:04

## 2018-02-22 RX ADMIN — SODIUM CHLORIDE, SODIUM LACTATE, POTASSIUM CHLORIDE, AND CALCIUM CHLORIDE PRN ML: .6; .31; .03; .02 INJECTION, SOLUTION INTRAVENOUS at 17:04

## 2018-02-22 NOTE — OPERATIVE REPORT
Operative Report


DATE OF SURGERY: 02/22/18


Operative Report: 





The risks benefits and alternatives of the procedure explained to the patient 

in detail and informed consent is obtained.A GIF Olympus video scope was 

inserted into the patient's mouth and hypopharynx, the esophagus is identified 

intubated and insufflated, the scope was then advanced through the esophagus 

stomach and duodenum ,retroflexion maneuver is done, the esophagus stomach and 

first and second portions of the duodenum examined


PREOPERATIVE DIAGNOSIS: Melena? peptic ulcer disease.  Previous history of 

peptic ulcer disease


POSTOPERATIVE DIAGNOSIS: Previous ulcer cavity noted in the antrum.  New clean-

based ulcer status post biopsy.  Duodenitis


OPERATION: EGD with biopsy


SURGEON: SETH BELTRE


ANESTHESIA: Moderate Sedation - 2 mg of Versed, 75 mcg of fentanyl.  Conscious 

sedation monitoring time 30 minutes.


TISSUE REMOVED OR ALTERED: As noted above.  Rule out malignancy


COMPLICATIONS: 





None.


ESTIMATED BLOOD LOSS: None.


INTRAOPERATIVE FINDINGS: As noted above.


PROCEDURE: 





Patient tolerated procedure well.


No immediate postprocedure complications are noted.


Patient discharged back to her room in good condition.


Resume regular diet as tolerated


Resume activity level


Follow-up on biopsies


PPI


Avoid NSAIDs


Repeat scope in 6-8 weeks

## 2018-02-22 NOTE — PDOC PROGRESS REPORT
Subjective


Progress Note for:: 02/22/18


Subjective:: 





Patient states that she is waiting for her EGD.  Patient states that she has 

not had any problems overnight.  Patient's family was present at bedside.


Reason For Visit: 


UPPER GI BLEED








Physical Exam


Vital Signs: 


 











Temp Pulse Resp BP Pulse Ox


 


 98.4 F   66   18   143/79 H  99 


 


 02/22/18 08:19  02/22/18 10:23  02/22/18 10:23  02/22/18 08:19  02/22/18 10:23








 Intake & Output











 02/21/18 02/22/18 02/23/18





 06:59 06:59 06:59


 


Intake Total  1013 


 


Balance  1013 


 


Weight  56.8 kg 











General appearance: PRESENT: no acute distress, well-developed, well-nourished


Head exam: PRESENT: atraumatic, normocephalic


Eye exam: PRESENT: conjunctiva pink, EOMI.  ABSENT: scleral icterus


Ear exam: PRESENT: normal external ear exam


Mouth exam: PRESENT: moist, tongue midline


Neck exam: ABSENT: carotid bruit, JVD, lymphadenopathy, thyromegaly


Respiratory exam: PRESENT: clear to auscultation jeimy.  ABSENT: rales, rhonchi, 

wheezes


Cardiovascular exam: PRESENT: RRR.  ABSENT: diastolic murmur, rubs, systolic 

murmur


Pulses: PRESENT: normal dorsalis pedis pul


Vascular exam: PRESENT: normal capillary refill


GI/Abdominal exam: PRESENT: normal bowel sounds, soft.  ABSENT: distended, 

guarding, mass, organolmegaly, rebound, tenderness


Rectal exam: PRESENT: deferred


Extremities exam: PRESENT: full ROM.  ABSENT: calf tenderness, clubbing, pedal 

edema


Neurological exam: PRESENT: alert, awake, oriented to person, oriented to place

, oriented to time, oriented to situation, CN II-XII grossly intact.  ABSENT: 

motor sensory deficit


Psychiatric exam: PRESENT: appropriate affect, normal mood.  ABSENT: homicidal 

ideation, suicidal ideation


Skin exam: PRESENT: dry, intact, warm.  ABSENT: cyanosis, rash





Results


Laboratory Results: 


 





 02/22/18 08:50 





 02/22/18 01:57 





 











  02/21/18 02/22/18 02/22/18





  20:35 01:57 01:57


 


WBC  14.5 H  12.4 H 


 


RBC  3.43 L  3.31 L 


 


Hgb  10.9 L  10.4 L 


 


Hct  31.2 L  30.1 L 


 


MCV  91  91 


 


MCH  31.6  31.4 


 


MCHC  34.8  34.5 


 


RDW  12.7  12.5 


 


Plt Count  338  328 


 


Sodium    138.5


 


Potassium    4.3


 


Chloride    107


 


Carbon Dioxide    26


 


Anion Gap    6


 


BUN    12


 


Creatinine    0.76


 


Est GFR ( Amer)    > 60


 


Est GFR (Non-Af Amer)    > 60


 


Glucose    97


 


Calcium    8.5


 


Magnesium    2.2


 


Total Bilirubin    0.5


 


AST    16


 


ALT    37


 


Alkaline Phosphatase    66


 


Total Protein    5.4 L


 


Albumin    3.3 L














  02/22/18





  08:50


 


WBC  11.4 H


 


RBC  3.44 L


 


Hgb  10.7 L


 


Hct  31.7 L


 


MCV  92


 


MCH  31.1


 


MCHC  33.7


 


RDW  12.9


 


Plt Count  343


 


Sodium 


 


Potassium 


 


Chloride 


 


Carbon Dioxide 


 


Anion Gap 


 


BUN 


 


Creatinine 


 


Est GFR ( Amer) 


 


Est GFR (Non-Af Amer) 


 


Glucose 


 


Calcium 


 


Magnesium 


 


Total Bilirubin 


 


AST 


 


ALT 


 


Alkaline Phosphatase 


 


Total Protein 


 


Albumin 














Assessment & Plan





- Diagnosis


(1) Upper GI bleed


Is this a current diagnosis for this admission?: Yes   


Plan: 


Most likely secondary to NSAIDs: Patient on PPI drip.  Patient awaiting EGD.  

Patient's hemoglobin after volume expansion has remained at 10.  We will 

continue to monitor.








(2) HTN (hypertension)


Is this a current diagnosis for this admission?: Yes   


Plan: 


We will hold home medications for now will monitor blood pressure.








(3) Anemia


Is this a current diagnosis for this admission?: Yes   


Plan: 


Most likely acute on chronic secondary to upper GI bleed: Patient's on PPI drip 

awaiting EGD.  Patient's hemoglobin has remained stable at 10








(4) DVT prophylaxis


Is this a current diagnosis for this admission?: Yes   


Plan: 


SCDs








- Time


Time Spent with patient: 15-24 minutes

## 2018-02-23 LAB
ABSOLUTE LYMPHOCYTES# (MANUAL): 3.8 10^3/UL (ref 0.5–4.7)
ABSOLUTE MONOCYTES # (MANUAL): 0.4 10^3/UL (ref 0.1–1.4)
ABSOLUTE NEUTROPHILS# (MANUAL): 4.5 10^3/UL (ref 1.7–8.2)
ADD MANUAL DIFF: YES
ALBUMIN SERPL-MCNC: 3.2 G/DL (ref 3.5–5)
ALP SERPL-CCNC: 59 U/L (ref 38–126)
ALT SERPL-CCNC: 26 U/L (ref 9–52)
ANION GAP SERPL CALC-SCNC: 5 MMOL/L (ref 5–19)
AST SERPL-CCNC: 14 U/L (ref 14–36)
BASOPHILS NFR BLD MANUAL: 1 % (ref 0–2)
BILIRUB DIRECT SERPL-MCNC: 0.3 MG/DL (ref 0–0.4)
BILIRUB SERPL-MCNC: 0.5 MG/DL (ref 0.2–1.3)
BUN SERPL-MCNC: 9 MG/DL (ref 7–20)
CALCIUM: 9 MG/DL (ref 8.4–10.2)
CHLORIDE SERPL-SCNC: 106 MMOL/L (ref 98–107)
CO2 SERPL-SCNC: 27 MMOL/L (ref 22–30)
EOSINOPHIL NFR BLD MANUAL: 5 % (ref 0–6)
ERYTHROCYTE [DISTWIDTH] IN BLOOD BY AUTOMATED COUNT: 12.7 % (ref 11.5–14)
GLUCOSE SERPL-MCNC: 87 MG/DL (ref 75–110)
HCT VFR BLD CALC: 31.8 % (ref 36–47)
HGB BLD-MCNC: 10.9 G/DL (ref 12–15.5)
MCH RBC QN AUTO: 31.4 PG (ref 27–33.4)
MCHC RBC AUTO-ENTMCNC: 34.2 G/DL (ref 32–36)
MCV RBC AUTO: 92 FL (ref 80–97)
MONOCYTES % (MANUAL): 4 % (ref 3–13)
NEUTS BAND NFR BLD MANUAL: 2 % (ref 3–5)
PLATELET # BLD: 318 10^3/UL (ref 150–450)
PLATELET COMMENT: ADEQUATE
POTASSIUM SERPL-SCNC: 4 MMOL/L (ref 3.6–5)
PROT SERPL-MCNC: 5.4 G/DL (ref 6.3–8.2)
RBC # BLD AUTO: 3.45 10^6/UL (ref 3.72–5.28)
RBC MORPH BLD: (no result)
SEGMENTED NEUTROPHILS % (MAN): 47 % (ref 42–78)
SODIUM SERPL-SCNC: 138.4 MMOL/L (ref 137–145)
TOTAL CELLS COUNTED BLD: 100
VARIANT LYMPHS NFR BLD MANUAL: 41 % (ref 13–45)
WBC # BLD AUTO: 9.2 10^3/UL (ref 4–10.5)

## 2018-02-23 RX ADMIN — SODIUM CHLORIDE, SODIUM LACTATE, POTASSIUM CHLORIDE, AND CALCIUM CHLORIDE PRN ML: .6; .31; .03; .02 INJECTION, SOLUTION INTRAVENOUS at 03:30

## 2018-02-23 RX ADMIN — SODIUM CHLORIDE, SODIUM LACTATE, POTASSIUM CHLORIDE, AND CALCIUM CHLORIDE PRN ML: .6; .31; .03; .02 INJECTION, SOLUTION INTRAVENOUS at 23:44

## 2018-02-23 RX ADMIN — PANTOPRAZOLE SODIUM PRN MG: 40 INJECTION, POWDER, FOR SOLUTION INTRAVENOUS at 23:44

## 2018-02-23 RX ADMIN — PANTOPRAZOLE SODIUM PRN MG: 40 INJECTION, POWDER, FOR SOLUTION INTRAVENOUS at 03:28

## 2018-02-23 RX ADMIN — SODIUM CHLORIDE, SODIUM LACTATE, POTASSIUM CHLORIDE, AND CALCIUM CHLORIDE PRN ML: .6; .31; .03; .02 INJECTION, SOLUTION INTRAVENOUS at 13:55

## 2018-02-23 RX ADMIN — BUDESONIDE AND FORMOTEROL FUMARATE DIHYDRATE SCH PUFF: 160; 4.5 AEROSOL RESPIRATORY (INHALATION) at 21:10

## 2018-02-23 RX ADMIN — PANTOPRAZOLE SODIUM PRN MG: 40 INJECTION, POWDER, FOR SOLUTION INTRAVENOUS at 13:55

## 2018-02-23 NOTE — PDOC PROGRESS REPORT
Subjective


Progress Note for:: 02/23/18


Subjective:: 





Patient underwent and tolerated upper endoscopy yesterday.


She was noted to have a previous, old ulcer cavity in the antrum


She does have a new clean-based ulcer at the gastric outlet.


Biopsies noted as negative for malignancy.


Her blood count is stable.


Her diet can be advanced.


If no other issues she likely can get discharge, however she would need repeat 

upper endoscopy following PPI treatment.


This is to document healing versus progression of the ulcer.


She should discontinue all NSAIDs.


Her biopsies are negative for Helicobacter pylori.


She should be discharged on high-dose PPI.


Reason For Visit: 


UPPER GI BLEED








Physical Exam


Vital Signs: 


 











Temp Pulse Resp BP Pulse Ox


 


 98.0 F   70   20   153/90 H  100 


 


 02/23/18 07:55  02/23/18 08:24  02/23/18 08:24  02/23/18 07:55  02/23/18 08:24








 Intake & Output











 02/22/18 02/23/18 02/24/18





 06:59 06:59 06:59


 


Intake Total 1013 3533 


 


Balance 1013 3533 


 


Weight 56.8 kg 56.7 kg 











General appearance: PRESENT: no acute distress, well-developed, well-nourished


Head exam: PRESENT: atraumatic, normocephalic


Eye exam: PRESENT: EOMI, PERRLA.  ABSENT: nystagmus, periorbital swelling, 

scleral icterus


Mouth exam: PRESENT: moist, neck supple


Throat exam: ABSENT: tonsillar exudate, tonsillogmegaly


Neck exam: ABSENT: meningismus, tenderness, thyromegaly


Respiratory exam: PRESENT: symmetrical, unlabored.  ABSENT: tachypnea, wheezes


Cardiovascular exam: PRESENT: RRR, +S1, +S2


GI/Abdominal exam: PRESENT: soft.  ABSENT: rebound, rigid, tenderness


Musculoskeletal exam: PRESENT: full ROM


Neurological exam: PRESENT: oriented to time, oriented to situation, CN II-XII 

grossly intact


Focused psych exam: ABSENT: restlessness


Skin exam: PRESENT: normal color.  ABSENT: mottled, pallor, petechiae, urticaria

, vesicles





Results


Laboratory Results: 


 





 02/23/18 04:17 





 02/23/18 04:17 





 











  02/23/18 02/23/18





  04:17 04:17


 


WBC  9.2 


 


RBC  3.45 L 


 


Hgb  10.9 L 


 


Hct  31.8 L 


 


MCV  92 


 


MCH  31.4 


 


MCHC  34.2 


 


RDW  12.7 


 


Plt Count  318 


 


Seg Neutrophils %  Not Reportable 


 


Lymphocytes %  Not Reportable 


 


Monocytes %  Not Reportable 


 


Eosinophils %  Not Reportable 


 


Basophils %  Not Reportable 


 


Absolute Neutrophils  Not Reportable 


 


Absolute Lymphocytes  Not Reportable 


 


Absolute Monocytes  Not Reportable 


 


Absolute Eosinophils  Not Reportable 


 


Absolute Basophils  Not Reportable 


 


Sodium   138.4


 


Potassium   4.0


 


Chloride   106


 


Carbon Dioxide   27


 


Anion Gap   5


 


BUN   9


 


Creatinine   0.74


 


Est GFR ( Amer)   > 60


 


Est GFR (Non-Af Amer)   > 60


 


Glucose   87


 


Calcium   9.0


 


Magnesium   2.0


 


Total Bilirubin   0.5


 


AST   14


 


ALT   26


 


Alkaline Phosphatase   59


 


Total Protein   5.4 L


 


Albumin   3.2 L














Assessment & Plan





- Diagnosis


(1) Upper GI bleed


Is this a current diagnosis for this admission?: Yes   





(2) Gastric ulcer


Plan: 


Clean-based gastric ulcer which likely bled in the past


No active bleeding noted


Known history of previous peptic ulcer disease


Was inadvertently started back on nonsteroidal likely the cause of a new ulcer


H&H seems to be stable


Discontinue all NSAIDs


High-dose PPI


Repeat scope in 6-8 weeks to document healing


Current biopsies are negative for any malignancy








- Time


Time Spent with patient: 15-24 minutes

## 2018-02-23 NOTE — PDOC PROGRESS REPORT
Subjective


Progress Note for:: 02/23/18


Subjective:: 





Pt states that she is doing well.  Pt states that she has been up ambulating 

around room.   was at bedside. 


Reason For Visit: 


UPPER GI BLEED








Physical Exam


Vital Signs: 


 











Temp Pulse Resp BP Pulse Ox


 


 98.0 F   70   20   153/90 H  100 


 


 02/23/18 07:55  02/23/18 08:24  02/23/18 08:24  02/23/18 07:55  02/23/18 08:24








 Intake & Output











 02/22/18 02/23/18 02/24/18





 06:59 06:59 06:59


 


Intake Total 1013 3533 


 


Balance 1013 3533 


 


Weight 56.8 kg 56.7 kg 











General appearance: PRESENT: no acute distress, well-developed, well-nourished


Head exam: PRESENT: atraumatic, normocephalic


Eye exam: PRESENT: conjunctiva pink, EOMI.  ABSENT: scleral icterus


Ear exam: PRESENT: normal external ear exam


Mouth exam: PRESENT: moist, tongue midline


Neck exam: ABSENT: carotid bruit, JVD, lymphadenopathy, thyromegaly


Respiratory exam: PRESENT: clear to auscultation jeimy.  ABSENT: rales, rhonchi, 

wheezes


Cardiovascular exam: PRESENT: RRR.  ABSENT: diastolic murmur, rubs, systolic 

murmur


Pulses: PRESENT: normal dorsalis pedis pul


Vascular exam: PRESENT: normal capillary refill


GI/Abdominal exam: PRESENT: normal bowel sounds, soft.  ABSENT: distended, 

guarding, mass, organolmegaly, rebound, tenderness


Rectal exam: PRESENT: deferred


Extremities exam: PRESENT: full ROM.  ABSENT: calf tenderness, clubbing, pedal 

edema


Neurological exam: PRESENT: alert, awake, oriented to person, oriented to place

, oriented to time, oriented to situation, CN II-XII grossly intact.  ABSENT: 

motor sensory deficit


Psychiatric exam: PRESENT: appropriate affect, normal mood.  ABSENT: homicidal 

ideation, suicidal ideation


Skin exam: PRESENT: dry, intact, warm.  ABSENT: cyanosis, rash





Results


Laboratory Results: 


 





 02/23/18 04:17 





 02/23/18 04:17 





 











  02/23/18 02/23/18





  04:17 04:17


 


WBC  9.2 


 


RBC  3.45 L 


 


Hgb  10.9 L 


 


Hct  31.8 L 


 


MCV  92 


 


MCH  31.4 


 


MCHC  34.2 


 


RDW  12.7 


 


Plt Count  318 


 


Seg Neutrophils %  Not Reportable 


 


Lymphocytes %  Not Reportable 


 


Monocytes %  Not Reportable 


 


Eosinophils %  Not Reportable 


 


Basophils %  Not Reportable 


 


Absolute Neutrophils  Not Reportable 


 


Absolute Lymphocytes  Not Reportable 


 


Absolute Monocytes  Not Reportable 


 


Absolute Eosinophils  Not Reportable 


 


Absolute Basophils  Not Reportable 


 


Sodium   138.4


 


Potassium   4.0


 


Chloride   106


 


Carbon Dioxide   27


 


Anion Gap   5


 


BUN   9


 


Creatinine   0.74


 


Est GFR ( Amer)   > 60


 


Est GFR (Non-Af Amer)   > 60


 


Glucose   87


 


Calcium   9.0


 


Magnesium   2.0


 


Total Bilirubin   0.5


 


AST   14


 


ALT   26


 


Alkaline Phosphatase   59


 


Total Protein   5.4 L


 


Albumin   3.2 L














Assessment & Plan





- Diagnosis


(1) Upper GI bleed


Is this a current diagnosis for this admission?: Yes   


Plan: 


Most likely secondary to NSAIDs with EGD demonstrating esophagitis, gastritis, 

duodenitis, gastric ulcer: Patient will continue on PPI drip for 72 hours and 

then transition to oral PPI twice daily for 2 weeks followed by 1 PPI tablet 

daily.








(2) HTN (hypertension)


Is this a current diagnosis for this admission?: Yes   


Plan: 


Will place pt on Lisinopril 10 mg PO Qdaily. 








(3) Anemia


Is this a current diagnosis for this admission?: Yes   


Plan: 


Most likely acute on chronic secondary to upper GI bleed: We will continue PPI 

drip








(4) DVT prophylaxis


Is this a current diagnosis for this admission?: Yes   


Plan: 


SCDs








- Time


Time Spent with patient: 15-24 minutes

## 2018-02-24 VITALS — SYSTOLIC BLOOD PRESSURE: 122 MMHG | DIASTOLIC BLOOD PRESSURE: 56 MMHG

## 2018-02-24 LAB
ABSOLUTE LYMPHOCYTES# (MANUAL): 2.8 10^3/UL (ref 0.5–4.7)
ABSOLUTE MONOCYTES # (MANUAL): 1 10^3/UL (ref 0.1–1.4)
ABSOLUTE NEUTROPHILS# (MANUAL): 4.9 10^3/UL (ref 1.7–8.2)
ADD MANUAL DIFF: YES
ALBUMIN SERPL-MCNC: 3.1 G/DL (ref 3.5–5)
ALP SERPL-CCNC: 48 U/L (ref 38–126)
ALT SERPL-CCNC: 26 U/L (ref 9–52)
ANION GAP SERPL CALC-SCNC: 6 MMOL/L (ref 5–19)
AST SERPL-CCNC: 16 U/L (ref 14–36)
BASOPHILS NFR BLD MANUAL: 0 % (ref 0–2)
BILIRUB DIRECT SERPL-MCNC: 0 MG/DL (ref 0–0.4)
BILIRUB SERPL-MCNC: 0.3 MG/DL (ref 0.2–1.3)
BUN SERPL-MCNC: 17 MG/DL (ref 7–20)
CALCIUM: 9.2 MG/DL (ref 8.4–10.2)
CHLORIDE SERPL-SCNC: 105 MMOL/L (ref 98–107)
CO2 SERPL-SCNC: 26 MMOL/L (ref 22–30)
EOSINOPHIL NFR BLD MANUAL: 1 % (ref 0–6)
ERYTHROCYTE [DISTWIDTH] IN BLOOD BY AUTOMATED COUNT: 12.6 % (ref 11.5–14)
GLUCOSE SERPL-MCNC: 102 MG/DL (ref 75–110)
HCT VFR BLD CALC: 28.8 % (ref 36–47)
HGB BLD-MCNC: 9.9 G/DL (ref 12–15.5)
MCH RBC QN AUTO: 31.6 PG (ref 27–33.4)
MCHC RBC AUTO-ENTMCNC: 34.5 G/DL (ref 32–36)
MCV RBC AUTO: 92 FL (ref 80–97)
MONOCYTES % (MANUAL): 11 % (ref 3–13)
PLATELET # BLD: 259 10^3/UL (ref 150–450)
PLATELET COMMENT: ADEQUATE
POTASSIUM SERPL-SCNC: 4.2 MMOL/L (ref 3.6–5)
PROT SERPL-MCNC: 5 G/DL (ref 6.3–8.2)
RBC # BLD AUTO: 3.15 10^6/UL (ref 3.72–5.28)
RBC MORPH BLD: (no result)
SEGMENTED NEUTROPHILS % (MAN): 56 % (ref 42–78)
SODIUM SERPL-SCNC: 137.4 MMOL/L (ref 137–145)
TOTAL CELLS COUNTED BLD: 100
VARIANT LYMPHS NFR BLD MANUAL: 32 % (ref 13–45)
WBC # BLD AUTO: 8.7 10^3/UL (ref 4–10.5)
WBC TOXIC VACUOLES BLD QL SMEAR: PRESENT

## 2018-02-24 RX ADMIN — BUDESONIDE AND FORMOTEROL FUMARATE DIHYDRATE SCH PUFF: 160; 4.5 AEROSOL RESPIRATORY (INHALATION) at 09:07

## 2018-02-24 RX ADMIN — SODIUM CHLORIDE, SODIUM LACTATE, POTASSIUM CHLORIDE, AND CALCIUM CHLORIDE PRN ML: .6; .31; .03; .02 INJECTION, SOLUTION INTRAVENOUS at 09:02

## 2018-02-24 RX ADMIN — PANTOPRAZOLE SODIUM PRN MG: 40 INJECTION, POWDER, FOR SOLUTION INTRAVENOUS at 09:02

## 2018-02-24 NOTE — PDOC DISCHARGE SUMMARY
General





- Admit/Disc Date/PCP


Admission Date/Primary Care Provider: 


  02/21/18 16:10





  FCO HERNDON MD





Discharge Date: 02/24/18





- Discharge Diagnosis


(1) Upper GI bleed


Is this a current diagnosis for this admission?: Yes   


Summary: 


Secondary to esophagitis, gastritis, gastric ulcer, duodenitis: Patient was 

admitted where she underwent EGD that demonstrated the following issues.  

Patient was placed on a PPI drip for 72 hours and transition to Dexilant 60 mg 

p.o. daily.  Patient will follow up with GI in 2-4 weeks.  Patient was told to 

return if she experiences dark stools of bright red blood in vomit








(2) HTN (hypertension)


Is this a current diagnosis for this admission?: Yes   


Summary: 


Patient was placed on lisinopril for blood pressure control.  Medications been 

tolerated well.








(3) Anemia


Is this a current diagnosis for this admission?: Yes   


Summary: 


We will place on iron replacement.








- Additional Information


Resuscitation Status: Full Code


Prescriptions: 


Dexlansoprazole [Dexilant] 60 mg PO DAILY #30 cap.


Lisinopril [Prinivil 10 mg Tablet] 10 mg PO DAILY #30 tablet


Home Medications: 








Alendronate Sodium [Fosamax 70 mg Tablet] 70 mg PO SA@0800 02/21/18 


Budesonide/Formoterol Fumarate [Symbicort -4.5 mcg Inhaler 6 gm] 2 puff 

IH Q12 02/21/18 


Dexlansoprazole [Dexilant] 60 mg PO DAILY #30 capbp 02/24/18 


Lisinopril [Prinivil 10 mg Tablet] 10 mg PO DAILY #30 tablet 02/24/18 











History of Present Illness


Patient complains of: Dark stools


History of Present Illness: 





Patient is a 67-year-old female that presents to our facility with complaint of 

dark stools.  Patient states that she was started on naproxen on February 14 

for back pain.  Patient states for the last day or 2 she has noticed that she 

has had black stools.  ER physician guaiac stools that were heme positive.





Hospital Course


Hospital Course: 





Patient 67-year-old female that was admitted to our facility after complaining 

of dark stools.  Patient also reported that she had been using NSAIDs as well.  

Patient had an EGD that was performed that demonstrated gastritis, duodenitis, 

esophagitis, and gastric ulcer.  Patient did not demonstrate active bleeding.  

Patient was placed on PPI drip for 72 hours and transitioned over to Dexilant 

60 mg p.o. daily.  Patient was noted to have hypertension and was placed on 

lisinopril 10 mg p.o. daily.  Patient's blood pressure has been under better 

control and patient is tolerating medication.  Patient was instructed to stop 

using NSAIDs i.e. Advil, ibuprofen, aspirin.  Patient was told to return if she 

experiences symptoms again.





Physical Exam


Vital Signs: 


 











Temp Pulse Resp BP Pulse Ox


 


 98.5 F   62   18   122/56 L  100 


 


 02/24/18 11:40  02/24/18 11:40  02/24/18 11:40  02/24/18 11:40  02/24/18 11:40








 Intake & Output











 02/23/18 02/24/18 02/25/18





 06:59 06:59 06:59


 


Intake Total 3533 3767 530


 


Balance 3533 3767 530


 


Weight 56.7 kg 59.1 kg 











General appearance: PRESENT: no acute distress, well-developed, well-nourished


Head exam: PRESENT: atraumatic, normocephalic


Eye exam: PRESENT: conjunctiva pink, EOMI.  ABSENT: scleral icterus


Ear exam: PRESENT: normal external ear exam


Mouth exam: PRESENT: moist, tongue midline


Neck exam: ABSENT: carotid bruit, JVD, lymphadenopathy, thyromegaly


Respiratory exam: PRESENT: clear to auscultation jeimy.  ABSENT: rales, rhonchi, 

wheezes


Cardiovascular exam: PRESENT: RRR.  ABSENT: diastolic murmur, rubs, systolic 

murmur


Pulses: PRESENT: normal dorsalis pedis pul


Vascular exam: PRESENT: normal capillary refill


GI/Abdominal exam: PRESENT: normal bowel sounds, soft.  ABSENT: distended, 

guarding, mass, organolmegaly, rebound, tenderness


Rectal exam: PRESENT: deferred


Extremities exam: PRESENT: full ROM.  ABSENT: calf tenderness, clubbing, pedal 

edema


Neurological exam: PRESENT: alert, awake, oriented to person, oriented to place

, oriented to time, oriented to situation, CN II-XII grossly intact.  ABSENT: 

motor sensory deficit


Psychiatric exam: PRESENT: appropriate affect, normal mood.  ABSENT: homicidal 

ideation, suicidal ideation


Skin exam: PRESENT: dry, intact, warm.  ABSENT: cyanosis, rash





Results


Laboratory Results: 


 





 02/24/18 05:35 





 02/24/18 05:35 





 











  02/24/18 02/24/18





  05:35 05:35


 


WBC  8.7 


 


RBC  3.15 L 


 


Hgb  9.9 L 


 


Hct  28.8 L 


 


MCV  92 


 


MCH  31.6 


 


MCHC  34.5 


 


RDW  12.6 


 


Plt Count  259 


 


Seg Neutrophils %  Not Reportable 


 


Lymphocytes %  Not Reportable 


 


Monocytes %  Not Reportable 


 


Eosinophils %  Not Reportable 


 


Basophils %  Not Reportable 


 


Absolute Neutrophils  Not Reportable 


 


Absolute Lymphocytes  Not Reportable 


 


Absolute Monocytes  Not Reportable 


 


Absolute Eosinophils  Not Reportable 


 


Absolute Basophils  Not Reportable 


 


Sodium   137.4


 


Potassium   4.2


 


Chloride   105


 


Carbon Dioxide   26


 


Anion Gap   6


 


BUN   17


 


Creatinine   0.94


 


Est GFR ( Amer)   > 60


 


Est GFR (Non-Af Amer)   59 L


 


Glucose   102


 


Calcium   9.2


 


Total Bilirubin   0.3


 


AST   16


 


ALT   26


 


Alkaline Phosphatase   48


 


Total Protein   5.0 L


 


Albumin   3.1 L














Qualifiers





- *


**PATEINT BEING DISCHARGED WITH ANY OF THE FOLLOWING DIAGNOSIS?: No





Plan


Time Spent: Greater than 30 Minutes

## 2018-04-02 ENCOUNTER — HOSPITAL ENCOUNTER (OUTPATIENT)
Dept: HOSPITAL 62 - END | Age: 68
Discharge: HOME | End: 2018-04-02
Attending: INTERNAL MEDICINE
Payer: MEDICARE

## 2018-04-02 VITALS — SYSTOLIC BLOOD PRESSURE: 155 MMHG | DIASTOLIC BLOOD PRESSURE: 78 MMHG

## 2018-04-02 DIAGNOSIS — Z09: ICD-10-CM

## 2018-04-02 DIAGNOSIS — Z87.11: ICD-10-CM

## 2018-04-02 DIAGNOSIS — K29.50: Primary | ICD-10-CM

## 2018-04-02 DIAGNOSIS — K31.5: ICD-10-CM

## 2018-04-02 PROCEDURE — 43239 EGD BIOPSY SINGLE/MULTIPLE: CPT

## 2018-04-02 PROCEDURE — 88305 TISSUE EXAM BY PATHOLOGIST: CPT

## 2018-04-02 PROCEDURE — 0DB68ZX EXCISION OF STOMACH, VIA NATURAL OR ARTIFICIAL OPENING ENDOSCOPIC, DIAGNOSTIC: ICD-10-PCS | Performed by: INTERNAL MEDICINE

## 2018-04-02 PROCEDURE — 88342 IMHCHEM/IMCYTCHM 1ST ANTB: CPT

## 2018-04-02 NOTE — OPERATIVE REPORT
Operative Report


DATE OF SURGERY: 04/02/18


Operative Report: 





The risks benefits and alternatives of the procedure explained to the patient 

in detail and informed consent is obtained.A GIF Olympus video scope was 

inserted into the patient's mouth and hypopharynx, the esophagus is identified 

intubated and insufflated, the scope was then advanced through the esophagus 

stomach and duodenum, retroflexion maneuver is done, the esophagus stomach and 

first and second portions of the duodenum examined


PREOPERATIVE DIAGNOSIS: Follow-up gastric ulcer


POSTOPERATIVE DIAGNOSIS: Duodenal stricture likely due to healing of the ulcer.

  Gastritis status post biopsy.  The previous ulcer cavity is no longer 

visualized.


OPERATION: EGD with biopsy


SURGEON: SETH BELTRE


ANESTHESIA: LMAC


TISSUE REMOVED OR ALTERED: As noted above.


COMPLICATIONS: 





None.


ESTIMATED BLOOD LOSS: None.


INTRAOPERATIVE FINDINGS: As noted above.


PROCEDURE: 





Patient tolerated procedure well.


No immediate postprocedure complications are noted.


Patient discharged in good condition.


Discharge date April 2, 2018.


Discharge diet: Regular.


Discharge activity: Regular.


2-3 week follow-up to discuss findings.


Patient is instructed call the office or proceed to the emergency room should 

there be any further problems or questions.


We will wait on pathology.

## 2019-10-28 ENCOUNTER — HOSPITAL ENCOUNTER (OUTPATIENT)
Dept: HOSPITAL 62 - WI | Age: 69
End: 2019-10-28
Attending: PHYSICIAN ASSISTANT
Payer: COMMERCIAL

## 2019-10-28 DIAGNOSIS — Z78.0: ICD-10-CM

## 2019-10-28 DIAGNOSIS — Z12.31: Primary | ICD-10-CM

## 2019-10-28 PROCEDURE — 77080 DXA BONE DENSITY AXIAL: CPT

## 2019-10-28 PROCEDURE — 77067 SCR MAMMO BI INCL CAD: CPT

## 2019-10-28 PROCEDURE — 77063 BREAST TOMOSYNTHESIS BI: CPT

## 2019-10-28 NOTE — WOMENS IMAGING REPORT
EXAM DESCRIPTION:  BONE DENSITY HIP/SPINE



COMPLETED DATE/TIME:  10/28/2019 8:55 am



REASON FOR STUDY:  Z78.0 ASYMPTOMATIC MENOPAUSAL STATE Z12.31  ENCNTR SCREEN MAMMOGRAM FOR MALIGNANT 
NEOPLASM OF JUAN ANTONIO Z78.0  ASYMPTOMATIC MENOPAUSAL STATE



COMPARISON:   4/13/2017.



TECHNIQUE:  Dual-Energy X-ray Absorptiometry (DEXA) of the AP Spine and Hip.



LIMITATIONS:  None.



FINDINGS:  LUMBAR SPINE:

The bone mineral density (BMD) measured from L1-L4 in the AP projection correlates with a T-score of 
-1.6, which is osteopenia as defined by the World Health Organization.

BMD Change vs Baseline:  0.4% decrease.

HIP:

The bone mineral density (BMD) measured in the left femoral neck correlates with a T-score of -1.4, w
hich is osteopenia as defined by the World Health Organization.

BMD Change vs Baseline:  3.8% increase.

10 year Fracture Risk Assessment:

Major Osteoporotic Fracture:  12% without prior fracture and 19% with prior fracture.

Hip Fracture:  2.2% without prior fracture and 3.3% with prior fracture.



IMPRESSION:  1. LUMBAR SPINE WHO CLASSIFICATION: OSTEOPENIA.

2. HIP WHO CLASSIFICATION: OSTEOPENIA.

OVERALL ASSESSMENT:

WHO CLASSIFICATION:  OSTEOPENIA.



COMMENT:  The World Health Organization defines low BMD as follows:

T-score:

Normal:  Greater than -1.0

Osteopenia: Between -1.0 and -2.5

Osteoporosis:  Less than -2.5 without fractures

Established osteoporosis:  Less than -2.5 with fractures

In general, you may wish to consider:

Diagnosis          Treatment                     Follow-up DEXA

Normal BMD      Prevention                    2-3 years

Osteopenia       Prevention/Therapy        1-2 years

Osteoporosis     Therapy                        Yearly



TECHNICAL DOCUMENTATION:  JOB ID:  [id number]

 2011 [employer]- All Rights Reserved



Reading location - IP/workstation name: [email or web address]

## 2019-10-28 NOTE — WOMENS IMAGING REPORT
EXAM DESCRIPTION:  3D SCREENING MAMMO BILAT



COMPLETED DATE/TIME:  10/28/2019 8:55 am



REASON FOR STUDY:  Z12.31 ENCOUNTER FOR SCREENING MAMMOGRAM FOR MALIGNANT NEOPLASM OF BREAST Z12.31  
ENCNTR SCREEN MAMMOGRAM FOR MALIGNANT NEOPLASM OF JUAN ANTONIO Z78.0  ASYMPTOMATIC MENOPAUSAL STATE



COMPARISON:  2014 to 2017



EXAM PARAMETERS:  Views: Standard craniocaudal and mediolateral oblique views of each breast recorded
 using digital acquisition and breast tomosynthesis.

Read with the assistance of CAD.

.[facility] - [employer]  Version 9.2



LIMITATIONS:  None.



FINDINGS:  No suspicious masses, suspicious calcifications or architectural distortion. No areas of c
oncern.



IMPRESSION:   NEGATIVE MAMMOGRAM. BIRADS 1.



BREAST DENSITY:  b. There are scattered areas of fibroglandular density.



BIRAD:  ASSESSMENT:  1 NEGATIVE



RECOMMENDATION:  ROUTINE SCREENING



COMMENT:  The patient has been notified of the results by letter per MQSA requirements. Additional no
tification policies are in place for contacting patient with suspicious or incomplete findings.

Quality ID #225: The American College of Radiology recommends an annual screening mammogram for women
 aged 40 years or over. This facility utilizes a reminder system to ensure that all patients receive 
reminder letters, and/or direct phone calls for appointments. This includes reminders for routine scr
eening mammograms, diagnostic mammograms, or other Breast Imaging Interventions when appropriate.  Th
is patient will be placed in the appropriate reminder system.



TECHNICAL DOCUMENTATION:  FINDING NUMBER: (1)

ASSESSMENT:  (1)

JOB ID:  [id number]

 2011 [employer]- All Rights Reserved



Reading location - IP/workstation name: [email or web address]

## 2020-11-04 ENCOUNTER — HOSPITAL ENCOUNTER (OUTPATIENT)
Dept: HOSPITAL 62 - WI | Age: 70
End: 2020-11-04
Attending: PHYSICIAN ASSISTANT
Payer: MEDICARE

## 2020-11-04 DIAGNOSIS — Z12.31: Primary | ICD-10-CM

## 2020-11-04 PROCEDURE — 77063 BREAST TOMOSYNTHESIS BI: CPT

## 2020-11-04 PROCEDURE — 77067 SCR MAMMO BI INCL CAD: CPT

## 2020-11-04 NOTE — WOMENS IMAGING REPORT
EXAM DESCRIPTION:  3D SCREENING MAMMO BILAT



IMAGES COMPLETED DATE/TIME:  11/4/2020 9:12 am



REASON FOR STUDY:  ROUTINE SCREENING MAMMOGRAM Z12.31 Z12.31  ENCNTR SCREEN MAMMOGRAM FOR MALIGNANT N
EOPLASM OF JUAN ANTONIO



COMPARISON:  [facility] back to 2015



EXAM PARAMETERS:  Views: Standard craniocaudal and mediolateral oblique views of each breast recorded
 using digital acquisition and breast tomosynthesis.

Read with the assistance of CAD.

.[facility]  Version 9.2



LIMITATIONS:  None.



FINDINGS:  No suspicious masses, suspicious calcifications or architectural distortion. No areas of c
oncern.



IMPRESSION:   NEGATIVE MAMMOGRAM. BIRADS 1.



BREAST DENSITY:  b. There are scattered areas of fibroglandular density.



BIRAD:  ASSESSMENT:  1 NEGATIVE



RECOMMENDATION:  ROUTINE SCREENING



COMMENT:  The patient has been notified of the results by letter per MQSA requirements. Additional no
tification policies are in place for contacting patient with suspicious or incomplete findings.

Quality ID #225: The [employer] recommends an annual screening mammogram for women
 aged 40 years or over. This facility utilizes a reminder system to ensure that all patients receive 
reminder letters, and/or direct phone calls for appointments. This includes reminders for routine scr
eening mammograms, diagnostic mammograms, or other Breast Imaging Interventions when appropriate.  Th
is patient will be placed in the appropriate reminder system.



TECHNICAL DOCUMENTATION:  FINDING NUMBER: (1)

ASSESSMENT:  (1)

JOB ID:  [id number]

 2011 [employer]- All Rights Reserved



Reading location - IP/workstation name: [email or web address]